# Patient Record
(demographics unavailable — no encounter records)

---

## 2024-12-10 NOTE — HISTORY OF PRESENT ILLNESS
[FreeTextEntry1] : pt here for follow up.- chest pain  [de-identified] : Mr. Hyman is a 21 yo male with pmhx of abnormal CT chest findings presenting to the clinic with continued concerns of chest pain w/ associated left arm and left lower extremity numbness. Patient states that he has been experiencing these symptoms for close to two years now. He presented to the clinic last in 2023 where he had same complaints. He has seen cardiology who performed echo and stress test with negative results (only pertinent finding on echo was EF of 58%). EKG at last visit showed incomplete RBBB. He obtained a CT chest which show nodule in the lung concerning for inflammatory/infection in nature as well as lung nodules which appeared to represent intrapulmonary lymph nodes and was prescribed doxy for 7 day course by Pulm as to which he says did not help. He did not follow up for a repeat CT. He was also evaluated from a psychological standpoint as it appeared that anxiety, stress might have initiated symptoms.   Now he states that the symptoms that he has been experiencing intermittently for quite some time have been occurring daily for two weeks. He states he experiences middle sternal chest pain with eventual numbness/tingling of LUE and LLE which occurs for five minutes. States it happens randomly throughout the day. Denies active exertional causative factors of chest pain. Denies any stressful, anxious events that initiate chest pain. He does state that he has almost what appears to be a panic attack when he experiences these symptoms. Denies any acid reflux issues noting that he might have acid reflux when he eats hard boiled eggs but no foods typically otherwise initiate those symptoms. Drinks one cup of coffee daily. Denies current drug use or tobacco use. Is a social drinker. Denies any fever or cough or sob on exertion. Denies recent weight loss.

## 2024-12-10 NOTE — REVIEW OF SYSTEMS
[Chest Pain] : chest pain [Negative] : Respiratory [Palpitations] : no palpitations [de-identified] : Occasional LUE and LLE weakness with chest pain

## 2024-12-10 NOTE — HEALTH RISK ASSESSMENT
[Little interest or pleasure doing things] : 1) Little interest or pleasure doing things [Feeling down, depressed, or hopeless] : 2) Feeling down, depressed, or hopeless [0] : 2) Feeling down, depressed, or hopeless: Not at all (0) [PHQ-9 Negative - No further assessment needed] : PHQ-9 Negative - No further assessment needed [Never] : Never [[id number]] : 0

## 2024-12-10 NOTE — END OF VISIT
[FreeTextEntry3] : #chest pain- random comes and goes and gets tingling in arm and leg. saw cards and pulm last year and gi. now daily. get ekg. see cards again . had labs in june at [facility] for headache and ct head that was okay  #lung nodule- needs to recheck . ovrdue